# Patient Record
Sex: MALE | Race: WHITE | NOT HISPANIC OR LATINO | Employment: FULL TIME | URBAN - METROPOLITAN AREA
[De-identification: names, ages, dates, MRNs, and addresses within clinical notes are randomized per-mention and may not be internally consistent; named-entity substitution may affect disease eponyms.]

---

## 2017-11-21 ENCOUNTER — OFFICE VISIT (OUTPATIENT)
Dept: URGENT CARE | Facility: URGENT CARE | Age: 27
End: 2017-11-21
Payer: COMMERCIAL

## 2017-11-21 VITALS
BODY MASS INDEX: 32.34 KG/M2 | TEMPERATURE: 98.7 F | DIASTOLIC BLOOD PRESSURE: 84 MMHG | SYSTOLIC BLOOD PRESSURE: 148 MMHG | WEIGHT: 209.6 LBS | OXYGEN SATURATION: 96 % | HEART RATE: 87 BPM

## 2017-11-21 DIAGNOSIS — M25.511 ACUTE PAIN OF RIGHT SHOULDER: Primary | ICD-10-CM

## 2017-11-21 PROCEDURE — 99214 OFFICE O/P EST MOD 30 MIN: CPT | Performed by: PHYSICIAN ASSISTANT

## 2017-11-21 RX ORDER — CYCLOBENZAPRINE HCL 5 MG
5 TABLET ORAL 3 TIMES DAILY PRN
Qty: 30 TABLET | Refills: 0 | Status: SHIPPED | OUTPATIENT
Start: 2017-11-21

## 2017-11-21 RX ORDER — ACETAMINOPHEN AND CODEINE PHOSPHATE 300; 30 MG/1; MG/1
1-2 TABLET ORAL EVERY 4 HOURS PRN
Qty: 10 TABLET | Refills: 0 | Status: SHIPPED | OUTPATIENT
Start: 2017-11-21

## 2017-11-21 RX ORDER — METHYLPREDNISOLONE 4 MG
TABLET, DOSE PACK ORAL
Qty: 21 TABLET | Refills: 0 | Status: SHIPPED | OUTPATIENT
Start: 2017-11-21

## 2017-11-21 NOTE — MR AVS SNAPSHOT
After Visit Summary   11/21/2017    Panfilo High    MRN: [id number]           Patient Information     Date Of Birth          1990        Visit Information        Provider Department      11/21/2017 7:15 PM Tiffany Costa PA-C [facility]        Today's Diagnoses     Acute pain of right shoulder    -  1      Care Instructions    (M25.511) Acute pain of right shoulder  (primary encounter diagnosis)  Comment:   Plan: ORTHOPEDICS ADULT REFERRAL, methylPREDNISolone         (MEDROL DOSEPAK) 4 MG tablet, cyclobenzaprine         (FLEXERIL) 5 MG tablet, acetaminophen-codeine         (TYLENOL WITH CODEINE #3) 300-30 MG per tablet          See work note    Follow up with orthopedics within one week, sooner should symptoms persist or worsen.            Follow-ups after your visit        Additional Services     ORTHOPEDICS ADULT REFERRAL       Your provider has referred you to: [facility] [place] [phone]   [email or web address]    Please be aware that coverage of these services is subject to the terms and limitations of your health insurance plan.  Call member services at your health plan with any benefit or coverage questions.      Please bring the following to your appointment:    >>   Any x-rays, CTs or MRIs which have been performed.  Contact the facility where they were done to arrange for  prior to your scheduled appointment.    >>   List of current medications   >>   This referral request   >>   Any documents/labs given to you for this referral                  Who to contact     If you have questions or need follow up information about today's clinic visit or your schedule please contact [facility] directly at [phone].  Normal or non-critical lab and imaging results will be communicated to you by MyChart, letter or phone within 4 business days after the clinic has received the results.  "If you do not hear from us within 7 days, please contact the clinic through [employer] or phone. If you have a critical or abnormal lab result, we will notify you by phone as soon as possible.  Submit refill requests through [employer] or call your pharmacy and they will forward the refill request to us. Please allow 3 business days for your refill to be completed.          Additional Information About Your Visit        [employer] Data Information     [employer] lets you send messages to your doctor, view your test results, renew your prescriptions, schedule appointments and more. To sign up, go to [email or web address] . Click on \"Log in\" on the left side of the screen, which will take you to the Welcome page. Then click on \"Sign up Now\" on the right side of the page.     You will be asked to enter the access code listed below, as well as some personal information. Please follow the directions to create your username and password.     Your access code is: [email or web address]  Expires: 2018  8:17 PM     Your access code will  in 90 days. If you need help or a new code, please call your [place] clinic or [phone].        Care EveryWhere ID     This is your Care EveryWhere ID. This could be used by other organizations to access your [place] medical records  [id number]        Your Vitals Were     Pulse Temperature Pulse Oximetry BMI (Body Mass Index)          87 98.7  F (37.1  C) (Oral) 96% 32.34 kg/m2         Blood Pressure from Last 3 Encounters:   17 148/84   11/05/15 122/76   09 144/76    Weight from Last 3 Encounters:   17 209 lb 9.6 oz (95.1 kg)   11/05/15 213 lb (96.6 kg)   09 206 lb (93.4 kg) (94 %)*     * Growth percentiles are based on CDC 2-20 Years data.              We Performed the Following     ORTHOPEDICS ADULT REFERRAL          Today's Medication Changes          These changes are accurate as of: 17  8:17 PM.  If you have any questions, ask your nurse or doctor.             "   Start taking these medicines.        Dose/Directions    acetaminophen-codeine 300-30 MG per tablet   Commonly known as:  TYLENOL WITH CODEINE #3   Used for:  Acute pain of right shoulder   Started by:  Tiffany Costa PA-C        Dose:  1-2 tablet   Take 1-2 tablets by mouth every 4 hours as needed   Quantity:  10 tablet   Refills:  0       cyclobenzaprine 5 MG tablet   Commonly known as:  FLEXERIL   Used for:  Acute pain of right shoulder   Started by:  Tiffany Costa PA-C        Dose:  5 mg   Take 1 tablet (5 mg) by mouth 3 times daily as needed   Quantity:  30 tablet   Refills:  0       methylPREDNISolone 4 MG tablet   Commonly known as:  MEDROL DOSEPAK   Used for:  Acute pain of right shoulder   Started by:  Tiffany Costa PA-C        Follow package instructions   Quantity:  21 tablet   Refills:  0            Where to get your medicines      These medications were sent to [facility]. [address] [facility] [address]     Phone:  [phone]     cyclobenzaprine 5 MG tablet    methylPREDNISolone 4 MG tablet         Some of these will need a paper prescription and others can be bought over the counter.  Ask your nurse if you have questions.     Bring a paper prescription for each of these medications     acetaminophen-codeine 300-30 MG per tablet                Primary Care Provider Office Phone # Fax #    Paul Julian [phone]       [facility] [address] [address]   [facility] [address]        Equal Access to Services     LILY HYATT AH: Hadii ayse ku hadasho Soomaali, waaxda luqadaha, qaybta kaalmada adeegyada, waxay idiin hayaan adeeg kharash la'aan . So [facility] [phone].    ATENCIÓN: Si habla español, tiene a cast disposición servicios gratuitos de asistencia lingüística. Llame al [phone].    We comply with applicable federal civil rights laws and [place] laws. We do not  discriminate on the basis of race, color, national origin, age, disability, sex, sexual orientation, or gender identity.            Thank you!     Thank you for choosing [facility]  for your care. Our goal is always to provide you with excellent care. Hearing back from our patients is one way we can continue to improve our services. Please take a few minutes to complete the written survey that you may receive in the mail after your visit with us. Thank you!             Your Updated Medication List - Protect others around you: Learn how to safely use, store and throw away your medicines at www.disposemymeds.org.          This list is accurate as of: 11/21/17  8:17 PM.  Always use your most recent med list.                   Brand Name Dispense Instructions for use Diagnosis    acetaminophen-codeine 300-30 MG per tablet    TYLENOL WITH CODEINE #3    10 tablet    Take 1-2 tablets by mouth every 4 hours as needed    Acute pain of right shoulder       cyclobenzaprine 5 MG tablet    FLEXERIL    30 tablet    Take 1 tablet (5 mg) by mouth 3 times daily as needed    Acute pain of right shoulder       IBUPROFEN PO           methylPREDNISolone 4 MG tablet    MEDROL DOSEPAK    21 tablet    Follow package instructions    Acute pain of right shoulder       NO ACTIVE MEDICATIONS     0    .        omeprazole 40 MG capsule    priLOSEC    30 capsule    Take 1 capsule (40 mg) by mouth daily Take 30-60 minutes before a meal.    Gastritis

## 2017-11-21 NOTE — LETTER
[place] [facility] [place]  [address] [address]  [phone]      November 21, 2017    RE:  Panfilo High                                                                                                                                                       [address]   [facility] [address]            To whom it may concern:    Panfilo High was seen in clinic today for right shoulder pain.  He may return to work tomorrow but may not use his right arm to lift greater than or equal to 5 pounds through 11/28/17.          Sincerely,        [facility]

## 2017-11-22 NOTE — PATIENT INSTRUCTIONS
(M25.552) Acute pain of right shoulder  (primary encounter diagnosis)  Comment:   Plan: ORTHOPEDICS ADULT REFERRAL, methylPREDNISolone         (MEDROL DOSEPAK) 4 MG tablet, cyclobenzaprine         (FLEXERIL) 5 MG tablet, acetaminophen-codeine         (TYLENOL WITH CODEINE #3) 300-30 MG per tablet          See work note    Follow up with orthopedics within one week, sooner should symptoms persist or worsen.

## 2017-11-22 NOTE — PROGRESS NOTES
"SUBJECTIVE:  Chief Complaint   Patient presents with     Shoulder right     Rt shoulder pain x 3 days. Pt states that he had a box fall off the shelf at work about 5 days ago. Not sure if pain is from that.      Panfilo High is a 27 year old male presents with a chief complaint of:  1) right upper posterior shoulder pain for the past 3 days.    Denies any trauma.    Pain is worse with movement.    He is right hand dominant.  Denies any previous shoulder problems.      He has been icing it and taking ibuprofen 600mg BID with very little relief.    Some tingling \"shooting down arm to right wrist\"    He does do lifting and moving of boxes for his work.  He works in a pharmacy warehouse.      Past Medical History:   Diagnosis Date     NO ACTIVE PROBLEMS      Patient Active Problem List   Diagnosis     Generalized anxiety disorder     Depressive disorder, not elsewhere classified     CARDIOVASCULAR SCREENING; LDL GOAL LESS THAN 160     Social History   Substance Use Topics     Smoking status: Never Smoker     Smokeless tobacco: Never Used     Alcohol use 0.0 oz/week     0 Standard drinks or equivalent per week      Comment: 11/05/15: ~ 2-5 beers per week       ROS:  CONSTITUTIONAL:NEGATIVE for fever, chills, change in weight  INTEGUMENTARY/SKIN: NEGATIVE for worrisome rashes, moles or lesions  MUSCULOSKELETAL: as per HPINEURO: NEGATIVE for weakness, dizziness or paresthesias    EXAM:   /84  Pulse 87  Temp 98.7  F (37.1  C) (Oral)  Wt 209 lb 9.6 oz (95.1 kg)  SpO2 96%  BMI 32.34 kg/m2  Gen: as per HPI  Extremity: right shoulder: tenderness over anterior shoulder.  ROM is decreased secondary to pain.  Negative drop arm test.    There is not compromise to the distal circulation.  Pulses are +2 and CRT is brisk  GENERAL APPEARANCE: healthy, alert and no distress  SKIN: no suspicious lesions or rashes  NEURO: Normal strength and tone, sensory exam grossly normal, mentation intact and speech normal    M25.511) " Acute pain of right shoulder  (primary encounter diagnosis)  Comment:   Plan: ORTHOPEDICS ADULT REFERRAL, methylPREDNISolone         (MEDROL DOSEPAK) 4 MG tablet, cyclobenzaprine         (FLEXERIL) 5 MG tablet, acetaminophen-codeine         (TYLENOL WITH CODEINE #3) 300-30 MG per tablet          See work note    Follow up with orthopedics within one week, sooner should symptoms persist or worsen.      Patient expresses understanding and agreement with the assessment and plan as above.

## 2017-11-22 NOTE — NURSING NOTE
"Chief Complaint   Patient presents with     Shoulder right     Rt shoulder pain x 3 days. Pt states that he had a box fall off the shelf at work about 5 days ago. Not sure if pain is from that.        Initial /84  Pulse 87  Temp 98.7  F (37.1  C) (Oral)  Wt 209 lb 9.6 oz (95.1 kg)  SpO2 96%  BMI 32.34 kg/m2 Estimated body mass index is 32.34 kg/(m^2) as calculated from the following:    Height as of 4/2/09: 5' 7.5\" (1.715 m).    Weight as of this encounter: 209 lb 9.6 oz (95.1 kg).  Medication Reconciliation: complete    "

## 2023-03-28 ENCOUNTER — HOSPITAL ENCOUNTER (EMERGENCY)
Facility: CLINIC | Age: 33
Discharge: HOME | End: 2023-03-28
Attending: STUDENT IN AN ORGANIZED HEALTH CARE EDUCATION/TRAINING PROGRAM | Admitting: STUDENT IN AN ORGANIZED HEALTH CARE EDUCATION/TRAINING PROGRAM
Payer: COMMERCIAL

## 2023-03-28 VITALS
SYSTOLIC BLOOD PRESSURE: 150 MMHG | TEMPERATURE: 98.1 F | DIASTOLIC BLOOD PRESSURE: 80 MMHG | BODY MASS INDEX: 37.77 KG/M2 | WEIGHT: 235 LBS | HEIGHT: 66 IN | HEART RATE: 80 BPM | OXYGEN SATURATION: 99 % | RESPIRATION RATE: 20 BRPM

## 2023-03-28 DIAGNOSIS — S61.011A LACERATION OF RIGHT THUMB WITHOUT FOREIGN BODY WITHOUT DAMAGE TO NAIL, INITIAL ENCOUNTER: ICD-10-CM

## 2023-03-28 DIAGNOSIS — S61.210A LACERATION OF RIGHT INDEX FINGER WITHOUT FOREIGN BODY WITHOUT DAMAGE TO NAIL, INITIAL ENCOUNTER: ICD-10-CM

## 2023-03-28 PROCEDURE — 99283 EMERGENCY DEPT VISIT LOW MDM: CPT

## 2023-03-28 PROCEDURE — 12002 RPR S/N/AX/GEN/TRNK2.6-7.5CM: CPT

## 2023-03-28 ASSESSMENT — ACTIVITIES OF DAILY LIVING (ADL): ADLS_ACUITY_SCORE: 35

## 2023-03-28 ASSESSMENT — ENCOUNTER SYMPTOMS
NUMBNESS: 0
WEAKNESS: 0
WOUND: 1

## 2023-03-29 NOTE — ED PROVIDER NOTES
"  History     Chief Complaint:  Laceration       The history is provided by the patient.      Panfilo High is a 32 year old male who presents with lacerations to the right first and second fingers. The patient was using a mandolin slicer today to cut an onion, and he unfortunately was not using the guard. His fingers ended up slipping and he cut the right first and second fingers. He has no weakness or numbness. The patient notes bleeding, and he was unsure if this would stop. There is still bleeding from his thumb. His tetanus is up to date.     Independent Historian:   None - Patient Only    Review of External Notes: [facility] (last tetanus in 2020)    ROS:  Review of Systems   Skin: Positive for wound (lacerations).   Neurological: Negative for weakness and numbness.   All other systems reviewed and are negative.      Allergies:  No Known Drug Allergies     Medications:    Omeprazole     Past Medical History:    Generalized anxiety disorder  Depressive disorder      Family History:    Depression     Social History:  The patient presents alone.   PCP: Paul Julian     Physical Exam     Patient Vitals for the past 24 hrs:   BP Temp Temp src Pulse Resp SpO2 Height Weight   03/28/23 1926 (!) 150/80 98.1  F (36.7  C) Oral 80 20 99 % 1.676 m (5' 6\") 106.6 kg (235 lb)        Physical Exam  Vitals: Reviewed, as above.    General: Alert and oriented. Resting on bed.  Skin: Warm and well-perfused.  2 cm V-shaped laceration to lateral aspect of right thumb, approaching but not involving the edge of the nail.  1 cm skin flap type laceration to tip of right index finger.  Bleeding is controlled.  HEENT:   Head: Normocephalic, atraumatic. Facial features symmetric.   Eyes: Conjunctiva pink, sclera white. EOMs grossly intact.   Ears: Auricles without lesion, erythema, or edema.   Nose: Symmetric with no discharge.  Neck: Supple with no lymphadenopathy. Full ROM.   Pulmonary: Chest wall expansion symmetric with no increased work " of breathing. Lungs clear to auscultation bilaterally.   Cardiovascular: Extremities are warm and well perfused. 2+ radial pulses bilaterally.  Musculoskeletal: Moves all extremities spontaneously.  5/5 strength with flexion and extension at the right first and second fingers.  Neuro: Patient is alert and oriented to person place time.  Speech fluent with normal cognition.  Two-point discrimination intact to right first and second fingers.  Psych: Affect appropriate.  Answers questions appropriately. Patient appears calm.        Emergency Department Course     Procedures     Laceration Repair      Procedure: Laceration Repair    Indication: Laceration    Consent: Verbal    Location: Right R first (thumb) finger    Length: 2 cm    Preparation: Irrigation with Sterile Saline.    Anesthesia/Sedation: Bupivacaine - 0.5%      Treatment/Exploration: Wound explored, no foreign bodies found     Closure: The wound was closed with one layer. Skin/superficial layer was closed with 6 x 5-0 Nylon using Interrupted sutures.     Patient Status: The patient tolerated the procedure well: Yes. There were no complications.      Laceration Repair      Procedure: Laceration Repair    Indication: Laceration    Consent: Verbal    Location: Right R second (index) finger    Length: 1 cm    Preparation: Irrigation with Sterile Saline.    Anesthesia/Sedation: Bupivacaine - 0.5%      Treatment/Exploration: Wound explored, no foreign bodies found     Closure: The wound was closed with one layer. Skin/superficial layer was closed with 2 x 5-0 Nylon using Interrupted sutures.     Patient Status: The patient tolerated the procedure well: Yes. There were no complications.       Emergency Department Course & Assessments:       Assessments:  2038 I obtained history and examined the patient as noted above.  2045 I further evaluated the patient and numbed the affected digits.    2116 I performed a laceration repair, see procedure note above.       Independent Interpretation (X-rays, CTs, rhythm strip):  None    Consultations/Discussion of Management or Tests:  None     Social Determinants of Health affecting care:   None    Disposition:  The patient was discharged to home.     Impression & Plan      Medical Decision Making:  Panfilo High is a 32 year old male who presents with lacerations to the right first and second fingers. Please see HPI and physical exam for full details.  Differential diagnosis includes laceration, tendon or nerve injury, blood vessel injury, among others.  Patient has a reassuring physical exam and is neurovascularly intact.  Wounds were thoroughly cleansed and repaired according to procedure note above.  Tetanus was up-to-date.  We discussed signs of infection and return precautions.  Patient is comfortable with the plan.      Diagnosis:    ICD-10-CM    1. Laceration of right thumb without foreign body without damage to nail, initial encounter  S61.011A       2. Laceration of right index finger without foreign body without damage to nail, initial encounter  S61.210A            Scribe Disclosure:  I, Ama Jose, am serving as a scribe at 7:44 PM on 3/28/2023 to document services personally performed by Julieta Park PA-C based on my observations and the provider's statements to me.     3/28/2023   Julieta Park PA-C Sells, Jenna, PA-C  03/28/23 1976

## 2023-03-29 NOTE — ED TRIAGE NOTES
Patient cut his right 1st and 2nd finger on a mandoline  Slicer today     Triage Assessment     Row Name 03/28/23 1928       Triage Assessment (Adult)    Airway WDL WDL       Respiratory WDL    Respiratory WDL WDL       Skin Circulation/Temperature WDL    Skin Circulation/Temperature WDL WDL       Cardiac WDL    Cardiac WDL WDL       Peripheral/Neurovascular WDL    Peripheral Neurovascular WDL WDL       Cognitive/Neuro/Behavioral WDL    Cognitive/Neuro/Behavioral WDL WDL